# Patient Record
Sex: MALE | Race: BLACK OR AFRICAN AMERICAN | URBAN - METROPOLITAN AREA
[De-identification: names, ages, dates, MRNs, and addresses within clinical notes are randomized per-mention and may not be internally consistent; named-entity substitution may affect disease eponyms.]

---

## 2018-01-29 ENCOUNTER — HISTORICAL (OUTPATIENT)
Dept: ENDOSCOPY | Facility: HOSPITAL | Age: 28
End: 2018-01-29

## 2022-04-10 ENCOUNTER — HISTORICAL (OUTPATIENT)
Dept: ADMINISTRATIVE | Facility: HOSPITAL | Age: 32
End: 2022-04-10

## 2022-04-29 VITALS
SYSTOLIC BLOOD PRESSURE: 136 MMHG | HEIGHT: 74 IN | BODY MASS INDEX: 35.34 KG/M2 | OXYGEN SATURATION: 98 % | DIASTOLIC BLOOD PRESSURE: 83 MMHG | WEIGHT: 275.38 LBS

## 2022-04-30 NOTE — OP NOTE
DATE OF SURGERY:    01/29/2018    SURGEON:  Shena Varma M.D.    ATTENDING PHYSICIAN:  Jerson Wolf MD    PREOPERATIVE DIAGNOSES:  Hematemesis, abdominal pain, nausea, vomiting.    POSTOPERATIVE DIAGNOSIS:  Mild gastritis.    PROCEDURE PERFORMED:  Esophagogastroduodenoscopy with antral biopsy.    RESIDENT:  Shena Varma, PGY2.    ANESTHESIA:  Sedation with propofol.    EXTENT OF THE EXAM:  To the duodenum.    INDICATION FOR THE EXAM:  The patient is a 27-year-old male who presented in October with significant hematemesis and reports that he has had epigastric pain, nausea, vomiting for the last 4 years and it is associated or exacerbated with fried foods.  Since October when his hematemesis occurred, he reports no further hematemesis.  He did not have an EGD done at that time due to lack of insurance per patient.  Also reports he could not afford his PPIs so has not been taking those since that time as well.  Patient has a past medical history of hyperlipidemia, alcohol abuse, GERD, and obesity.  Patient does report that he still drinks a significant amount of alcohol.    PROCEDURE IN DETAIL:  Preoperatively, a history and physical were reviewed.  The major risks and benefits of the procedure were discussed with the patient in detail.  His questions were answered.  He verbalized understanding and agreement to proceed.  He was attached to the appropriate monitoring devices.  Continuous oxygen was provided via nasal cannula and IV sedation was administered in divided dosages throughout the procedure.  A timeout was done, properly identifying the patient and the procedure.  Patient was placed in a lateral decubitus position and adequate sedation was achieved.  A well-lubricated endoscope was then advanced over the patient's tongue and advanced under direct visualization into the esophagus, stomach, and duodenum.  It was slowly withdrawn and the mucosa was evaluated carefully.  The duodenal mucosa was normal without  any duodenitis present.  Antegrade and retrograde views of the stomach demonstrated some linear raghu-pyloric erythema and the antral biopsy was taken.  On retroflexion and inspection of the fundus of the stomach, there was some patchy erythema about the fundus, but no erosions.  The scope was withdrawn to examine the esophagus.  There was normal gastroesophageal junction with no abnormal mucosa, no erosions, mucosal changes, or polyps.  The remainder of the exam was normal.  The scope was then withdrawn and the procedure was completed.  The procedure was well tolerated without complications.  The patient was awoken and returned to recovery in stable condition.    FINDINGS:  Mild gastritis.    COMPLICATIONS:  None.    ESTIMATED BLOOD LOSS:  None.    SPECIMENS:  Antral biopsy.    FOLLOWUP:  Patient will need to follow up with his primary care physician in [facility].  Would recommend further workup for possibly biliary colic or cholelithiasis as possible source of his abdominal pain.  Would also recommend continued use of PPIs and cessation of alcohol use as well as diet change.        ______________________________  Shena Varma M.D.    SHANELLE  DD:  02/01/2018  Time:  06:56AM  DT:  02/01/2018  Time:  08:54AM  Job #:  [id number]